# Patient Record
(demographics unavailable — no encounter records)

---

## 2019-03-21 NOTE — NUR
RESTING QUIETLY WITH EYES CLOSED. RIGHT GROIN 5F EXOSEAL CDI, NO
BLEEDING OR HEMATOMA NOTED. NO NEEDS VOICED. VSS. WILL CONTINUE TO
MONITOR.

## 2019-03-21 NOTE — NUR
LEFT PIV D/C'D WITH CATHETER INTACT, BAND AID TO SITE. RIGHT GROIN 5F
EXOSEAL CDI, NO BLEEDING OR HEMATOMA NOTED.

## 2019-03-21 NOTE — NUR
2L NC, NO RESP DISTRESS. RIGHT GROIN 5F EXOSEAL CDI, NO BLEEDING OR
HEMATOMA NOTED. NO C/O PAIN OR NAUSEA. VSS. CALL LIGHT WITHIN REACH.

## 2019-03-21 NOTE — NUR
HOB ELEVATED 30 DEGREES. RIGHT GROIN 5F EXOSEAL CDI, NO BLEEDING OR
HEMATOMA NOTED. REFUSES A DRINK AND SANDWICH. VSS. WILL CONTINUE TO
MONITOR CLOSELY.

## 2019-03-21 NOTE — NUR
TAKEN OUT  VIA WHEELCHAIR BY CATH TEAM MEMBER. LEFT FACILITY WITH
FAMILY AND ALL PERSONAL BELONGINGS.

## 2019-03-21 NOTE — HEMODYNAMI
PATIENT:ELEAZAR CONNELYL                                    MEDICAL RECORD: [id number]
: 36                                            LOCATION:D.CAT          
ACCT# [id number]                                       ADMISSION DATE: 19
 
 
 Generatedon:3/21/573571:30
Patient name: ELEAZAR CONNELLY Patient #: [id number] Visit #: [id number] SSN: [id number]
[id number] :
1936 Date of study: 3/21/2019
Page: Of
Hemodynamic Procedure Report
****************************
Patient Data
Patient Demographics
Procedure consent was obtained
First Name: ELEAZAR            Gender: Female
Last Name: MARICEL           : 1936
Middle Initial: VIKI           Age: 82 year(s)
Patient #: [id number]       Race: 
Visit #: [id number]
SSN: 
Accession #:
[id number]
Additional ID: [id number]
Contact details
Address: [address] [place] [address]   Phone: [phone]
State: [place]
City: [facility]
Zip code: [address]
Past Medical History
Allergies
Allergen        Reaction        Date         Comments
Reported
Other allergy                   2016   augmentin, Contrast
Other allergy                   2018    IODINATED CONTRAST
DYE, AMOXICILIN,
LATEX
Contrast                        8/15/2018
Natural rubber                  8/15/2018
and latex
Admission
Admission Data
Admission Date: 3/21/2019   Admission Time: 6:20
Procedure
Procedure Types
Cath Procedure
Diagnostic Procedure
LHC
LHC w/Coronaries w/Grafts
Procedure Description
Procedure Date
Procedure Date: 3/21/2019
Procedure Start Time: 8:12
Procedure End Time: 8:30
Procedure Staff
Name                            Function
Liana Yee RT                    Scrub
Angelo Estevez MD                   Performing Physician
Arvin Goff RT                     Monitor
 
Fifi Vo RN                Nurse
Procedure Data
Cath Procedure
Fluoroscopy
Diagnostic fluoroscopy      Total fluoroscopy Time: 3.2
time: 3.2 min               min
Diagnostic fluoroscopy      Total fluoroscopy dose: 589
dose: 589 mGy               mGy
Contrast Material
Contrast Material Type                       Amount (ml)
Isovue 300                                   63
Entry Location
Entry     Primary  Successful  Side  Size  Upsize Upsize Entry    Closure Succes
sful  Closure
Location                             (Fr)  1 (Fr) 2 (Fr) Remarks  Device        
      Remarks
Femoral                        Right 5 Fr                         Exoseal
artery
Diagnostic catheters
Device Type               Used For           End Catheter
Placement
MULTIPACK JL 4.0 5Fr      Left Coronary
catheter                  Angiography
DIAGNOSTIC AR MOD 5Fr     SVG Angiography
Catheter ([id number])
DIAGNOSTIC IM 5Fr         SVG Angiography
catheter ([id number])
MULTIPACK Pigtail 5 Fr    LV Angiography
catheter
Procedure Complications
No complications
Procedure Medications
Medication           Administration Route Dosage
0.9% NaCl            I.V.                 100 ml/hr
Oxygen               etCO2 Nasal cannula  2 l/min
Lidocaine 2%         added to field       20
Heparin Flush Bag    added to field       2 bags
(1000units/500ml NS)
Versed               I.V.                 2 mg
Fentanyl             I.V.                 50 mcg
Versed               I.V.                 2 mg
Fentanyl             I.V.                 50 mcg
Hemodynamics
Rest
Heart Rate: 92 (bpm)
Pressure Samples
Time     Site     Value (mmHg) Purpose      Heart      Use
Rate(bpm)
8:23     LV       110/8,15     EDP          89
8:24     LV       134/8,16     Pullback     91
8:24     AO       130/61(91)   Pullback     91
Gradients
Valve  Time  Site 1   Site 2     Mean    SEP/DFP    Peak To Heart  Use
(mmHg)  (sec/min)  Peak    Rate
(mmHg)  (bpm)
Aortic 8:24  LV       AO         7       21         4       91
134/8,16 130/61(91)
Calculations
Valve    P-P      Mean      Valve     Index  Valve    Source
Name              Gradient  Area             Flow
 
(cm2)
Aortic   4        7
4        7
Snapshots
Pre Cath      Intra         NCS           Post Cath
Vital Signs
Time    Heart  Resp   SPO2 etCO2   NIBP (mmHg) Rhythm  Pain    Sedation
Rate   (ipm)  (%)  (mmHg)                      Status  Level
(bpm)
7:51:34 93     16     99   39      146/85(108) NSR     0 (11)  10(A)
, No
pain
7:56:02 93     11     100  37.6    147/84(114) NSR     0 (11)  10(A)
, No
pain
8:00:28 90     14     98   25.5    134/75(109) NSR     0 (11)  10(A)
, No
pain
8:04:57 89     15     99   28      127/68(101) NSR     0 (11)  10(A)
, No
pain
8:09:15 88     16     96   22      123/64(99)  NSR     0 (11)  10(A)
, No
pain
8:13:35 88     12     99   30.8    128/72(90)  NSR     0 (11)  10(A)
, No
pain
8:17:59 92     12     99   24      129/67(99)  NSR     0 (11)  9(A)
, No
pain
8:22:25 89     18     97   30      124/67(88)  NSR     0 (11)  9(A)
, No
pain
8:26:45 90     17     98   35      125/67(103) NSR     0 (11)  10(A)
, No
pain
Medications
Time    Medication       Route   Dose  Verified Delivered Reason     Notes  Effe
ctiveness
by       by
7:49:15 0.9% NaCl        I.V.    100   Angelo     Fifi     used for
ml/hr Herb Vo   procedure
RN
7:49:21 Oxygen           etCO2   2     Angelo     Fifi     used for
Nasal   l/min Herb Vo   procedure
cannula                RN
7:49:27 Lidocaine 2%     added   20ml  Angelo     Angelo      for local
to      vial  Herb Estevez MD  anesthetic
field
7:49:31 Heparin Flush    added   2     Angelo     Angelo      used for
Bag              to      bags  Herb Estevez MD  procedure
(1000units/500ml field
NS)
8:07:13 Versed           I.V.    2 mg  Angelo     Fifi     for
Herb Vo   sedation
RN
8:07:19 Fentanyl         I.V.    50    Angelo     Fifi     for
mcg   Herb Vo   sedation
RN
8:14:29 Versed           I.V.    2 mg  Angelo     Fifi     for
 
Herb Vo   sedation
RN
8:14:33 Fentanyl         I.V.    50    Angelo     Fifi     for
mcg   Herb Vo   sedation
RN
Procedure Log
Time     Note
7:36:08  Fifi Vo RN sent for patient. Start room use.
7:36:09  Time tracking: Regular hours (M-F 7:00 - 5:00)
7:36:14  Plan of Care:Hemodynamics will remain stable., Cardiac
rhythm will remain stable., Comfort level will be
maintained., Respiratory function will remain
adequate., Patient/ family verbilizes understanding of
procedure., Procedure tolerated without complication.,
Recovers from procedure without complications..
7:42:44  Patient received from Pre/Post Procedure Room to CCL 1
Alert and oriented. Tansferred to table in Supine
position.
7:42:45  Warm blankets applied, and elliott hugger turned on for
patient comfort.
7:42:46  Correct patient and procedure confirmed by team.
7:43:05  Signed procedure consent form obtained from patient.
7:43:11  ECG and BP/O2 sat monitors applied to patient.
7:49:07  Vital chart was started
7:49:15  0.9% NaCl 100 ml/hr I.V. was administered by Fifi Vo RN; used for procedure;
7:49:21  Oxygen 2 l/min etCO2 Nasal cannula was administered by
Fifi Vo RN; used for procedure;
7:49:27  Lidocaine 2% 20ml vial added to field was administered
by Angelo Estevez MD; for local anesthetic;
7:49:31  Heparin Flush Bag (1000units/500ml NS) 2 bags added to
field was administered by Angelo Estevez MD; used for
procedure;
7:53:12  Baseline sample Acquired.
7:53:14  Rhythm: sinus rhythm
7:53:15  Full Disclosure recording started
7:53:29  H&P Date Dictated: 3/18/2019 Within 30 days and on
chart..
7:53:31  Pre-op teaching completed and patient verbalized
understanding.
7:53:31  Pre-procedure instructions explained to patient.
7:53:34  Family in waiting room.
7:54:22  Patient NPO since Midnight.
7:54:55  Is the patient allergic to Iodine/contrast media? Yes.
7:55:26  Was the patient premedicated? Yes
7:55:30  Is patient on blood thinner?No
7:55:32  Patient diabetic? Yes.
7:55:35  If diabetic: On Metformin? No
7:55:36  ----Pre-sedation anethsthesia assessment.----
7:55:39  Previous problem with sedation/anesthesia? No ?
7:55:40  Snore? Yes
7:55:42  Sleep apnea? Yes
7:55:44  Deviated septum? No
7:55:46  Opens mouth fully? Yes
7:55:48  Sticks out tongue? Yes
7:55:53  Airway obstruction? Yes asthma
7:55:58  Dentures? Yes in tight
7:56:03  Pre procedure: right dorsailis pedis pulse 1+
Palpable, but thready & weak; easily obliterated
7:56:06  Modified Ken's test Ulnar < 7 seconds
 
7:56:10  Patient pain scale 0/10 ?.
7:56:17  IV patent on arrival in right antecubital with 0.9%
NaCl at 10ml/hr.
7:57:41  Lab results completed and on chart.
7:57:44  Right Radial & Right Groin area was prepped with
chlora-prep and draped in sterile fashion
7:57:46  Sharps counted by scrub and verified by R.N.
7:57:46  Alarms reviewed by R. N.
7:59:30  Use device set Femoral Dx
7:59:31  Bag Decanter (2002S) opened to sterile field.
7:59:31  ACIST Syringe (58214) opened to sterile field.
7:59:32  DIAGNOSTIC WIRE .035 260cm J wire (667506) opened to
sterile field.
7:59:32  Medline Cath Pack ([id number]) opened to sterile field.
7:59:34  ACIST Manifold (81743) opened to sterile field.
7:59:34  ACIST Hand Control (74842) opened to sterile field.
7:59:35  DIAGNOSTIC Multipack 5Fr catheter set ([id number]) opened
to sterile field.
7:59:36  Tegaderm 4 x 4 (1626W) opened to sterile field.
7:59:38  SHEATH 5FR Pullman ([id number]) opened to sterile field.
8:04:19  Physician paged
8:04:20  Physician arrived
8:04:21  Final Timeout: patient, procedure, and site verified
with staff and physician. All members of the team are
in agreement.
8:04:21  --------ALL STOP TIME OUT------
8:04:23  Right groin site verified by team.
8:04:28  Maximum allowable Isovue 300 dose 300ml. Physician
notified. (300ml for normal creatinines. For patients
with creatinine of 1.7 or higher multiply weight(kg) x
5 divided by creatinine.)
8:04:40  Fire Safety Assessment: A--An alcohol-based skin
anteseptic being used preoperatively., C--Open oxygen
or nitrous oxide is being used., D--An ESU, laser, or
fiber-optic light is being used.
8:04:44  Physical assessment completed. ASA score P 2 - A
patient with mild systemic disease as per Angelo Estevez MD.
8:04:48  Sedation plan: IV Moderate Sedation Medication:Versed,
Fentanyl
8:07:13  Versed 2 mg I.V. was administered by Fifi Vo RN;
for sedation;
8:07:19  Fentanyl 50 mcg I.V. was administered by Fifi Vo
RN; for sedation;
8:12:30  Procedure started.
8:12:37  Local anesthetic to right femoral artery with
Lidocaine 2% by Angelo Estevez MD.**INITIAL ACCESS ONLY**
8:12:45  A 5 Fr sheath was inserted into the Right Femoral
artery
8:12:59  Zero performed for pressure channel P1
8:14:11  A MULTIPACK JL 4.0 5Fr catheter was advanced over the
wire and used for Left Coronary Angiography.
8:14:20  LCA angiography performed.
8:14:29  Versed 2 mg I.V. was administered by Fifi Vo RN;
for sedation;
8:14:33  Fentanyl 50 mcg I.V. was administered by Fifi Vo
RN; for sedation;
8:16:07  Catheter exchanged over wire.
8:16:45  A DIAGNOSTIC AR MOD 5Fr Catheter ([id number]) was
advanced over the wire and used for SVG Angiography.
 
8:17:08  RCA angiography performed.
8:17:32  SVG to Circ angiography performed.
8:19:33  SVG to RCA occluded.
8:20:19  Catheter exchanged over wire.
8:20:49  A DIAGNOSTIC IM 5Fr catheter ([id number]) was advanced
over the wire and used for SVG Angiography.
8:20:57  SVG to LAD occluded.
8:22:22  Catheter exchanged over wire.
8:22:28  A MULTIPACK Pigtail 5 Fr catheter was advanced over
the wire and used for LV Angiography.
8:23:52  LV angiography performed.
8:24:01  EF : 55 %
8:24:15  Catheter exchanged over wire.
8:27:00  Sheath removed intact; hemostasis achieved with
Exoseal to the Right Femoral artery.
8:27:22  EXOSEAL 5Fr () opened to sterile field.
8:27:29  Contrast amount:Isovue 300 63ml.
8:27:37  Procedure ended.(Physican Out)
8:27:46  Fluoroscopy time 03.20 minutes.
8:27:51  Fluoroscopy dose: 589 mGy
8:27:51  Flurop Dose total: 589
8:27:53  Sharps counted by scrub and verified by R.N.
8:27:54  Insertion/operative site no bleeding no hematoma.
8:27:56  Post-op/insertion site Right Femoral artery dressed
using a 4 x 4 and Tegaderm.
8:28:00  Post right femoral artery:stable
8:28:02  Post Procedure Pulses reassessed and unchanged
8:28:03  Post procedure: right dorsailis pedis pulse 1+
Palpable, but thready & weak; easily obliterated.
8:28:07  Post procedure rhythm: sinus rhythm
8:28:08  Post procedure instruction explained to
patient.Patient verbalizes understanding.
8:28:09  Procedure and supply charges have been captured,
reviewed, submitted and are correct.
8:28:12  Procedure Complication : No complications
8:28:16  See physician's report for complete and final results.
8:28:16  Vital chart was stopped
8:28:22  Report given to Pre/Post Procedure Room.
8:28:25  Patient transfered to Pre/Post Procedure Room with
Stretcher.
8:28:51  Procedure type changed to Cath procedure, Diagnostic
procedure, LHC, LHC w/Coronaries w/Grafts
8:30:23  Full Disclosure recording stopped
8:30:23  Procedure ended.
8:30:28  End room use (Document Last)
Device Usage
Item Name   Manufacture  Quantity  Catalog    Hospital Part    Current Minimal L
ot# /
Number     Charge   Number  Stock   Stock   Serial#
Code
ACIST       Acist        1         47683      768589   612422  638966  20
Syringe     Medical
(89792)     Systems Inc
Bag         Microtek     1               637394   74625   466835  5
Decanter    Medical Inc.
()
Medline     Medline      1         [id number]  520816   52013   586734  5
Cath Pack
([id number])
DIAGNOSTIC  [place]      1         703545     723311   339611  417904  30
 
WIRE .035
260cm J
wire
(182826)
ACIST Hand  Acist        1         74004      149138   358589  209947  5
Control     Medical
(62145)     Systems Inc
ACIST       Acist        1         49415      549192   962849  133616  5
Manifold    Medical
(47484)     Systems Inc
DIAGNOSTIC  Cardinal     1         [id number]     397418   10823   621149  30
Multipack   Health
5Fr
catheter
set
([id number])
Tegaderm 4  3M           1         1626W      661038   302168  317792  5
x 4 (1626W)
SHEATH 5FR  Terumo       1         [id number]     048996   829611  293850  5
Pullman
(VLB814)
MULTIPACK   Cardinal     1                                     424978  5
JL 4.0 5Fr  Health
catheter
DIAGNOSTIC  Cardinal     1         958839U    919532   372421  449515  15
AR MOD 5Fr  Health
Catheter
(229077Q)
DIAGNOSTIC  Cardinal     1         656851F    638984   389896  929637  5
IM 5Fr      Health
catheter
(298188C)
MULTIPACK   Cardinal     1                                     857821  5
Pigtail 5   Health
Fr catheter
EXOSEAL 5Fr Cardinal     1               818604   874146  757662  10
()     Health
Signature Audit [place]
Stage           Time        Signature      Unsigned
Intra-Procedure 3/21/2019   Arvin Goff RT(R)
8:33:12 AM
Signatures
Monitor : Arvin Goff RT    Signature :
______________________________
Date : ______________ Time :
______________
 
 
 
 
 
 
 
 
 
[address] [address]